# Patient Record
Sex: FEMALE | Race: WHITE | NOT HISPANIC OR LATINO | URBAN - NONMETROPOLITAN AREA
[De-identification: names, ages, dates, MRNs, and addresses within clinical notes are randomized per-mention and may not be internally consistent; named-entity substitution may affect disease eponyms.]

---

## 2017-08-23 ENCOUNTER — NEW PATIENT (OUTPATIENT)
Dept: URBAN - NONMETROPOLITAN AREA CLINIC 6 | Facility: CLINIC | Age: 67
End: 2017-08-23
Payer: COMMERCIAL

## 2017-08-23 DIAGNOSIS — H25.13 AGE-RELATED NUCLEAR CATARACT, BILATERAL: ICD-10-CM

## 2017-08-23 DIAGNOSIS — H52.03 HYPERMETROPIA, BILATERAL: Primary | ICD-10-CM

## 2017-08-23 DIAGNOSIS — H52.4 PRESBYOPIA: ICD-10-CM

## 2017-08-23 PROCEDURE — 92015 DETERMINE REFRACTIVE STATE: CPT | Performed by: OPTOMETRIST

## 2017-08-23 PROCEDURE — 92004 COMPRE OPH EXAM NEW PT 1/>: CPT | Performed by: OPTOMETRIST

## 2017-08-23 RX ORDER — FLUOXETINE HYDROCHLORIDE 40 MG/1
40 MG CAPSULE ORAL
Qty: 0 | Refills: 0 | Status: ACTIVE
Start: 2017-08-23

## 2017-08-23 RX ORDER — LEVOTHYROXINE SODIUM 75 UG/1
TABLET ORAL
Qty: 0 | Refills: 0 | Status: INACTIVE
Start: 2017-08-23 | End: 2021-01-21

## 2017-08-23 ASSESSMENT — INTRAOCULAR PRESSURE
OS: 16
OD: 16

## 2017-08-23 ASSESSMENT — VISUAL ACUITY
OD: 20/20
OS: 20/20

## 2017-08-23 ASSESSMENT — KERATOMETRY
OS: 43.91
OD: 43.39

## 2019-11-19 ENCOUNTER — FOLLOW UP ESTABLISHED (OUTPATIENT)
Dept: URBAN - NONMETROPOLITAN AREA CLINIC 6 | Facility: CLINIC | Age: 69
End: 2019-11-19
Payer: COMMERCIAL

## 2019-11-19 PROCEDURE — 92014 COMPRE OPH EXAM EST PT 1/>: CPT | Performed by: OPTOMETRIST

## 2019-11-19 PROCEDURE — 92015 DETERMINE REFRACTIVE STATE: CPT | Performed by: OPTOMETRIST

## 2019-11-19 ASSESSMENT — INTRAOCULAR PRESSURE
OS: 16
OD: 16

## 2019-11-19 ASSESSMENT — VISUAL ACUITY: OD: 20/20

## 2019-11-19 ASSESSMENT — KERATOMETRY
OS: 43.89
OD: 43.58

## 2021-01-21 ENCOUNTER — NEW PATIENT (OUTPATIENT)
Dept: URBAN - NONMETROPOLITAN AREA CLINIC 6 | Facility: CLINIC | Age: 71
End: 2021-01-21
Payer: COMMERCIAL

## 2021-01-21 PROCEDURE — 99204 OFFICE O/P NEW MOD 45 MIN: CPT | Performed by: OPHTHALMOLOGY

## 2021-01-21 RX ORDER — PREDNISOLONE ACETATE 10 MG/ML
1 % SUSPENSION/ DROPS OPHTHALMIC
Qty: 5 | Refills: 1 | Status: ACTIVE
Start: 2021-01-21

## 2021-01-21 RX ORDER — TOBRAMYCIN 3 MG/ML
0.3 % SOLUTION/ DROPS OPHTHALMIC
Qty: 5 | Refills: 1 | Status: INACTIVE
Start: 2021-01-21 | End: 2021-03-11

## 2021-01-21 RX ORDER — TOBRAMYCIN 3 MG/ML
0.3 % SOLUTION/ DROPS OPHTHALMIC
Qty: 0 | Refills: 0 | Status: INACTIVE
Start: 2021-01-21 | End: 2021-01-21

## 2021-01-21 RX ORDER — LEVOTHYROXINE SODIUM 175 UG/1
CAPSULE ORAL
Qty: 0 | Refills: 0 | Status: ACTIVE
Start: 2021-01-21

## 2021-01-21 ASSESSMENT — INTRAOCULAR PRESSURE
OS: 16
OD: 16

## 2021-01-21 ASSESSMENT — KERATOMETRY
OS: 43.70
OD: 43.55

## 2021-01-21 ASSESSMENT — VISUAL ACUITY
OD: 20/20
OS: 20/20

## 2021-01-26 ENCOUNTER — Encounter (OUTPATIENT)
Dept: URBAN - NONMETROPOLITAN AREA CLINIC 6 | Facility: CLINIC | Age: 71
End: 2021-01-26
Payer: COMMERCIAL

## 2021-01-26 DIAGNOSIS — H25.813 COMBINED FORMS OF AGE-RELATED CATARACT, BILATERAL: Primary | ICD-10-CM

## 2021-02-10 ENCOUNTER — SURGERY (OUTPATIENT)
Dept: URBAN - NONMETROPOLITAN AREA SURGERY 1 | Facility: SURGERY | Age: 71
End: 2021-02-10
Payer: COMMERCIAL

## 2021-02-10 PROCEDURE — 66984 XCAPSL CTRC RMVL W/O ECP: CPT | Performed by: OPHTHALMOLOGY

## 2021-02-11 ENCOUNTER — POST OP (OUTPATIENT)
Dept: URBAN - NONMETROPOLITAN AREA CLINIC 6 | Facility: CLINIC | Age: 71
End: 2021-02-11
Payer: COMMERCIAL

## 2021-02-11 PROCEDURE — 99024 POSTOP FOLLOW-UP VISIT: CPT | Performed by: OPTOMETRIST

## 2021-02-11 ASSESSMENT — INTRAOCULAR PRESSURE: OS: 19

## 2021-02-18 ENCOUNTER — POST OP (OUTPATIENT)
Dept: URBAN - NONMETROPOLITAN AREA CLINIC 6 | Facility: CLINIC | Age: 71
End: 2021-02-18
Payer: COMMERCIAL

## 2021-02-18 DIAGNOSIS — Z96.1 PRESENCE OF INTRAOCULAR LENS: Primary | ICD-10-CM

## 2021-02-18 PROCEDURE — 99024 POSTOP FOLLOW-UP VISIT: CPT | Performed by: OPTOMETRIST

## 2021-02-18 ASSESSMENT — INTRAOCULAR PRESSURE
OD: 16
OS: 15

## 2021-02-18 ASSESSMENT — VISUAL ACUITY: OS: 20/20

## 2021-02-24 ENCOUNTER — SURGERY (OUTPATIENT)
Dept: URBAN - NONMETROPOLITAN AREA SURGERY 1 | Facility: SURGERY | Age: 71
End: 2021-02-24
Payer: COMMERCIAL

## 2021-02-24 PROCEDURE — 66984 XCAPSL CTRC RMVL W/O ECP: CPT | Performed by: OPHTHALMOLOGY

## 2021-02-25 ENCOUNTER — POST OP (OUTPATIENT)
Dept: URBAN - NONMETROPOLITAN AREA CLINIC 6 | Facility: CLINIC | Age: 71
End: 2021-02-25
Payer: COMMERCIAL

## 2021-02-25 PROCEDURE — 99024 POSTOP FOLLOW-UP VISIT: CPT | Performed by: OPTOMETRIST

## 2021-02-25 ASSESSMENT — INTRAOCULAR PRESSURE: OD: 20

## 2021-03-11 ENCOUNTER — POST OP (OUTPATIENT)
Dept: URBAN - NONMETROPOLITAN AREA CLINIC 6 | Facility: CLINIC | Age: 71
End: 2021-03-11
Payer: COMMERCIAL

## 2021-03-11 PROCEDURE — 99024 POSTOP FOLLOW-UP VISIT: CPT | Performed by: OPHTHALMOLOGY

## 2021-03-11 ASSESSMENT — INTRAOCULAR PRESSURE
OS: 16
OD: 16

## 2021-03-11 ASSESSMENT — VISUAL ACUITY
OS: 20/20
OD: 20/20

## 2021-04-01 ENCOUNTER — POST-OPERATIVE VISIT (OUTPATIENT)
Dept: URBAN - NONMETROPOLITAN AREA CLINIC 6 | Facility: CLINIC | Age: 71
End: 2021-04-01
Payer: COMMERCIAL

## 2021-04-01 PROCEDURE — 99024 POSTOP FOLLOW-UP VISIT: CPT | Performed by: OPTOMETRIST

## 2021-04-01 ASSESSMENT — VISUAL ACUITY
OD: 20/20
OS: 20/20

## 2021-04-01 ASSESSMENT — INTRAOCULAR PRESSURE
OS: 17
OD: 17

## 2021-04-01 NOTE — IMPRESSION/PLAN
Impression: S/P Cataract Extraction by phacoemulsification with IOL placement OD - 36 Days. Presence of intraocular lens  Z96.1. Excellent post op course   Condition is improving - Plan: --Advised patient to use artificial tears for comfort. Refresh prn. Ok to use [employer] QD.

## 2021-10-04 ENCOUNTER — OFFICE VISIT (OUTPATIENT)
Dept: URBAN - NONMETROPOLITAN AREA CLINIC 6 | Facility: CLINIC | Age: 71
End: 2021-10-04
Payer: COMMERCIAL

## 2021-10-04 DIAGNOSIS — H52.223 REGULAR ASTIGMATISM, BILATERAL: Primary | ICD-10-CM

## 2021-10-04 PROCEDURE — 92014 COMPRE OPH EXAM EST PT 1/>: CPT | Performed by: OPTOMETRIST

## 2021-10-04 ASSESSMENT — VISUAL ACUITY
OS: 20/20
OD: 20/20

## 2021-10-04 ASSESSMENT — INTRAOCULAR PRESSURE
OS: 17
OD: 16

## 2021-10-04 NOTE — IMPRESSION/PLAN
Impression: Presence of intraocular lens: Z96.1. Plan: Patient education regarding findings. No treatment recommended at this time. Continue to monitor annually.

## 2022-10-12 ENCOUNTER — OFFICE VISIT (OUTPATIENT)
Dept: URBAN - NONMETROPOLITAN AREA CLINIC 6 | Facility: CLINIC | Age: 72
End: 2022-10-12
Payer: COMMERCIAL

## 2022-10-12 DIAGNOSIS — H52.223 REGULAR ASTIGMATISM, BILATERAL: Primary | ICD-10-CM

## 2022-10-12 PROCEDURE — 92014 COMPRE OPH EXAM EST PT 1/>: CPT | Performed by: OPTOMETRIST

## 2022-10-12 ASSESSMENT — INTRAOCULAR PRESSURE
OS: 13
OD: 13

## 2022-10-12 ASSESSMENT — VISUAL ACUITY
OS: 20/20
OD: 20/20

## 2022-10-12 NOTE — IMPRESSION/PLAN
Impression: Regular astigmatism, bilateral: H52.223. Plan: Patient education regarding findings. Recommend glasses for optimal vision. Discussed adjustment period. Glasses Rx given to patient today. OK to use prn or make RX reading glasses.

## 2023-04-24 ENCOUNTER — OFFICE VISIT (OUTPATIENT)
Dept: URBAN - NONMETROPOLITAN AREA CLINIC 6 | Facility: CLINIC | Age: 73
End: 2023-04-24
Payer: COMMERCIAL

## 2023-04-24 DIAGNOSIS — H43.811 VITREOUS DETACHMENT OF RIGHT EYE: Primary | ICD-10-CM

## 2023-04-24 PROCEDURE — 92012 INTRM OPH EXAM EST PATIENT: CPT | Performed by: OPTOMETRIST

## 2023-04-24 ASSESSMENT — INTRAOCULAR PRESSURE
OS: 14
OD: 13

## 2023-04-24 NOTE — IMPRESSION/PLAN
Impression: Vitreous detachment of right eye: H43.811. Plan: Vitreous floaters accounts for patient's complaint. There is no evidence of retinal pathology. All signs and risks of retinal detachment or tears were discussed in detail. If patient notices any symptoms discussed, contact office ASAP. Recommend patient return for her scheduled Complete Exam in October 2023. Continue to monitor for visual changes, flashes of lights OU.

## 2023-10-12 ENCOUNTER — OFFICE VISIT (OUTPATIENT)
Dept: URBAN - NONMETROPOLITAN AREA CLINIC 6 | Facility: CLINIC | Age: 73
End: 2023-10-12
Payer: COMMERCIAL

## 2023-10-12 DIAGNOSIS — H26.493 OTHER SECONDARY CATARACT, BILATERAL: ICD-10-CM

## 2023-10-12 DIAGNOSIS — H52.4 PRESBYOPIA: ICD-10-CM

## 2023-10-12 DIAGNOSIS — H52.223 REGULAR ASTIGMATISM, BILATERAL: Primary | ICD-10-CM

## 2023-10-12 PROCEDURE — 92014 COMPRE OPH EXAM EST PT 1/>: CPT | Performed by: OPTOMETRIST

## 2023-10-12 ASSESSMENT — VISUAL ACUITY
OS: 20/20
OD: 20/20

## 2023-10-12 ASSESSMENT — INTRAOCULAR PRESSURE
OS: 16
OD: 17

## 2024-10-14 ENCOUNTER — OFFICE VISIT (OUTPATIENT)
Dept: URBAN - NONMETROPOLITAN AREA CLINIC 6 | Facility: CLINIC | Age: 74
End: 2024-10-14
Payer: COMMERCIAL

## 2024-10-14 DIAGNOSIS — H52.223 REGULAR ASTIGMATISM, BILATERAL: Primary | ICD-10-CM

## 2024-10-14 DIAGNOSIS — H26.493 OTHER SECONDARY CATARACT, BILATERAL: ICD-10-CM

## 2024-10-14 DIAGNOSIS — H02.839 DERMATOCHALASIS OF EYELID: ICD-10-CM

## 2024-10-14 PROCEDURE — 92014 COMPRE OPH EXAM EST PT 1/>: CPT | Performed by: OPTOMETRIST

## 2024-10-14 ASSESSMENT — KERATOMETRY
OS: 44.27
OD: 44.03

## 2024-10-14 ASSESSMENT — INTRAOCULAR PRESSURE
OS: 15
OD: 15

## 2024-10-14 ASSESSMENT — VISUAL ACUITY
OD: 20/20
OS: 20/20

## 2025-02-06 ENCOUNTER — OFFICE VISIT (OUTPATIENT)
Dept: URBAN - METROPOLITAN AREA CLINIC 24 | Facility: CLINIC | Age: 75
End: 2025-02-06
Payer: MEDICARE

## 2025-02-06 DIAGNOSIS — H57.813 BROW PTOSIS, BILATERAL: Primary | ICD-10-CM

## 2025-02-06 PROCEDURE — 99202 OFFICE O/P NEW SF 15 MIN: CPT | Performed by: OPHTHALMOLOGY

## 2025-02-06 PROCEDURE — 92285 EXTERNAL OCULAR PHOTOGRAPHY: CPT | Performed by: OPHTHALMOLOGY
